# Patient Record
Sex: MALE | Race: OTHER | HISPANIC OR LATINO | URBAN - METROPOLITAN AREA
[De-identification: names, ages, dates, MRNs, and addresses within clinical notes are randomized per-mention and may not be internally consistent; named-entity substitution may affect disease eponyms.]

---

## 2023-07-11 ENCOUNTER — EMERGENCY (EMERGENCY)
Facility: HOSPITAL | Age: 23
LOS: 1 days | Discharge: HOME | End: 2023-07-11
Attending: EMERGENCY MEDICINE | Admitting: EMERGENCY MEDICINE
Payer: SELF-PAY

## 2023-07-11 VITALS
DIASTOLIC BLOOD PRESSURE: 63 MMHG | HEART RATE: 87 BPM | RESPIRATION RATE: 96 BRPM | SYSTOLIC BLOOD PRESSURE: 100 MMHG | OXYGEN SATURATION: 96 % | HEIGHT: 60 IN | TEMPERATURE: 98 F

## 2023-07-11 DIAGNOSIS — R00.1 BRADYCARDIA, UNSPECIFIED: ICD-10-CM

## 2023-07-11 DIAGNOSIS — Z20.822 CONTACT WITH AND (SUSPECTED) EXPOSURE TO COVID-19: ICD-10-CM

## 2023-07-11 DIAGNOSIS — R07.89 OTHER CHEST PAIN: ICD-10-CM

## 2023-07-11 DIAGNOSIS — R61 GENERALIZED HYPERHIDROSIS: ICD-10-CM

## 2023-07-11 DIAGNOSIS — R55 SYNCOPE AND COLLAPSE: ICD-10-CM

## 2023-07-11 DIAGNOSIS — R42 DIZZINESS AND GIDDINESS: ICD-10-CM

## 2023-07-11 LAB
ALBUMIN SERPL ELPH-MCNC: 4.8 G/DL — SIGNIFICANT CHANGE UP (ref 3.3–5)
ALP SERPL-CCNC: 131 U/L — HIGH (ref 40–120)
ALT FLD-CCNC: 20 U/L — SIGNIFICANT CHANGE UP (ref 10–45)
ANION GAP SERPL CALC-SCNC: 9 MMOL/L — SIGNIFICANT CHANGE UP (ref 5–17)
AST SERPL-CCNC: 24 U/L — SIGNIFICANT CHANGE UP (ref 10–40)
BASOPHILS # BLD AUTO: 0.04 K/UL — SIGNIFICANT CHANGE UP (ref 0–0.2)
BASOPHILS NFR BLD AUTO: 0.3 % — SIGNIFICANT CHANGE UP (ref 0–2)
BILIRUB SERPL-MCNC: 0.6 MG/DL — SIGNIFICANT CHANGE UP (ref 0.2–1.2)
BUN SERPL-MCNC: 10 MG/DL — SIGNIFICANT CHANGE UP (ref 7–23)
CALCIUM SERPL-MCNC: 9.4 MG/DL — SIGNIFICANT CHANGE UP (ref 8.4–10.5)
CHLORIDE SERPL-SCNC: 107 MMOL/L — SIGNIFICANT CHANGE UP (ref 96–108)
CO2 SERPL-SCNC: 29 MMOL/L — SIGNIFICANT CHANGE UP (ref 22–31)
CREAT SERPL-MCNC: 0.7 MG/DL — SIGNIFICANT CHANGE UP (ref 0.5–1.3)
EGFR: 134 ML/MIN/1.73M2 — SIGNIFICANT CHANGE UP
EOSINOPHIL # BLD AUTO: 0.09 K/UL — SIGNIFICANT CHANGE UP (ref 0–0.5)
EOSINOPHIL NFR BLD AUTO: 0.6 % — SIGNIFICANT CHANGE UP (ref 0–6)
FLUAV AG NPH QL: SIGNIFICANT CHANGE UP
FLUBV AG NPH QL: SIGNIFICANT CHANGE UP
GLUCOSE SERPL-MCNC: 91 MG/DL — SIGNIFICANT CHANGE UP (ref 70–99)
HCT VFR BLD CALC: 46 % — SIGNIFICANT CHANGE UP (ref 39–50)
HGB BLD-MCNC: 16.4 G/DL — SIGNIFICANT CHANGE UP (ref 13–17)
IMM GRANULOCYTES NFR BLD AUTO: 0.4 % — SIGNIFICANT CHANGE UP (ref 0–0.9)
LYMPHOCYTES # BLD AUTO: 1.38 K/UL — SIGNIFICANT CHANGE UP (ref 1–3.3)
LYMPHOCYTES # BLD AUTO: 9.9 % — LOW (ref 13–44)
MCHC RBC-ENTMCNC: 31.7 PG — SIGNIFICANT CHANGE UP (ref 27–34)
MCHC RBC-ENTMCNC: 35.7 GM/DL — SIGNIFICANT CHANGE UP (ref 32–36)
MCV RBC AUTO: 89 FL — SIGNIFICANT CHANGE UP (ref 80–100)
MONOCYTES # BLD AUTO: 0.44 K/UL — SIGNIFICANT CHANGE UP (ref 0–0.9)
MONOCYTES NFR BLD AUTO: 3.2 % — SIGNIFICANT CHANGE UP (ref 2–14)
NEUTROPHILS # BLD AUTO: 11.87 K/UL — HIGH (ref 1.8–7.4)
NEUTROPHILS NFR BLD AUTO: 85.6 % — HIGH (ref 43–77)
NRBC # BLD: 0 /100 WBCS — SIGNIFICANT CHANGE UP (ref 0–0)
PLATELET # BLD AUTO: 296 K/UL — SIGNIFICANT CHANGE UP (ref 150–400)
POTASSIUM SERPL-MCNC: 4.2 MMOL/L — SIGNIFICANT CHANGE UP (ref 3.5–5.3)
POTASSIUM SERPL-SCNC: 4.2 MMOL/L — SIGNIFICANT CHANGE UP (ref 3.5–5.3)
PROT SERPL-MCNC: 7.9 G/DL — SIGNIFICANT CHANGE UP (ref 6–8.3)
RBC # BLD: 5.17 M/UL — SIGNIFICANT CHANGE UP (ref 4.2–5.8)
RBC # FLD: 12.8 % — SIGNIFICANT CHANGE UP (ref 10.3–14.5)
RSV RNA NPH QL NAA+NON-PROBE: SIGNIFICANT CHANGE UP
SARS-COV-2 RNA SPEC QL NAA+PROBE: SIGNIFICANT CHANGE UP
SODIUM SERPL-SCNC: 145 MMOL/L — SIGNIFICANT CHANGE UP (ref 135–145)
TROPONIN T, HIGH SENSITIVITY RESULT: 12 NG/L — SIGNIFICANT CHANGE UP (ref 0–51)
TROPONIN T, HIGH SENSITIVITY RESULT: 9 NG/L — SIGNIFICANT CHANGE UP (ref 0–51)
WBC # BLD: 13.87 K/UL — HIGH (ref 3.8–10.5)
WBC # FLD AUTO: 13.87 K/UL — HIGH (ref 3.8–10.5)

## 2023-07-11 PROCEDURE — 71046 X-RAY EXAM CHEST 2 VIEWS: CPT | Mod: 26

## 2023-07-11 PROCEDURE — 99285 EMERGENCY DEPT VISIT HI MDM: CPT

## 2023-07-11 RX ORDER — SODIUM CHLORIDE 9 MG/ML
1000 INJECTION INTRAMUSCULAR; INTRAVENOUS; SUBCUTANEOUS ONCE
Refills: 0 | Status: COMPLETED | OUTPATIENT
Start: 2023-07-11 | End: 2023-07-11

## 2023-07-11 RX ADMIN — SODIUM CHLORIDE 1000 MILLILITER(S): 9 INJECTION INTRAMUSCULAR; INTRAVENOUS; SUBCUTANEOUS at 21:03

## 2023-07-11 NOTE — ED ADULT NURSE NOTE - NSFALLUNIVINTERV_ED_ALL_ED
Bed/Stretcher in lowest position, wheels locked, appropriate side rails in place/Call bell, personal items and telephone in reach/Instruct patient to call for assistance before getting out of bed/chair/stretcher/Non-slip footwear applied when patient is off stretcher/[place] to call system/Physically safe environment - no spills, clutter or unnecessary equipment/Purposeful proactive rounding/Room/bathroom lighting operational, light cord in reach

## 2023-07-11 NOTE — ED PROVIDER NOTE - NSFOLLOWUPINSTRUCTIONS_ED_ALL_ED_FT
Presíncope  Near-Syncope  Outline of the head showing blood vessels that supply the brain.  El presíncope ocurre cuando, súbitamente, usted se siente nelsy si se fuera a desmayar, kathy en realidad no pierde la conciencia. A mala también se lo puede llamar amenaza de síncope. [place] un episodio de presíncope, usted puede tener los siguientes síntomas:  Sentirse mareado, débil, aturdido o nelsy si la habitación estuviera girando.  Sentir náuseas.  Aissatou manchas o aissatou todo murphy o todo fortunato en el [place] de visión.  Tener la piel fría y húmeda o sentir calor y sudor.  Oír zumbidos en los oídos (tinnitus).  La causa de esta afección es janeen disminución súbita del flujo de rozina al cerebro. Esta disminución puede ocurrir por varios motivos, kathy la mayoría de las veces no es peligroso. Sin embargo, el presíncope puede ser janeen señal de un problema médico grave, por eso es importante buscar atención médica.    Siga estas indicaciones en mata casa:  Medicamentos    Use los medicamentos de venta berlin y los recetados solamente nelsy se lo haya indicado el médico.  Si está tomando medicamentos para la presión arterial o para el corazón, póngase de pie lentamente, tómese algunos minutos para permanecer sentado y luego párese. [place] puede reducir los mareos y el riesgo de presíncope.  Estilo de viridiana    No conduzca vehículos, no use maquinarias ni practique deportes hasta que el médico lo autorice.  No zaida alcohol.  No consuma ningún producto que contenga nicotina o tabaco. Estos productos incluyen cigarrillos, tabaco para mascar y aparatos de vapeo, nelsy los cigarrillos electrónicos. Si necesita ayuda para dejar de consumir estos productos, consulte al médico.  Evite saunas y bañeras de hidromasajes.  Indicaciones generales    Esté atento a cualquier cambio en los síntomas.  [place] con el médico acerca de marian síntomas. Es posible que deba realizarse estudios para entender la causa del presíncope.  Si comienza a sentir que va a desmayarse, siéntese o recuéstese inmediatamente. Si se sienta, coloque la estrellita entre las piernas. Si se recuesta, levante (eleve) los pies por encima del nivel del corazón.  Respire profundamente y de manera continua. Espere hasta que los síntomas hayan desaparecido.  Pídale a alguien que se quede con usted hasta que se sienta estable.  Zaida suficiente líquido nelsy para mantener la orina de color amarillo pálido.  Evite permanecer de pie mucho tiempo. Si debe permanecer de pie gunjan mucho tiempo, realice movimientos nelsy los siguientes:   las piernas.  Cruzar las piernas.  Flexionar y estirar los músculos de las piernas.  Ponerse en cuclillas.  Concurra a todas las visitas de seguimiento. [place] es importante.  Comuníquese con un médico si:  Sigue teniendo episodios nelsy si fuera a desmayarse.  Solicite ayuda de inmediato si:  Se desmaya.  Tiene cualquiera de estos síntomas que pueden indicar problemas en el corazón:  Latidos cardíacos acelerados o irregulares (palpitaciones).  Dolor fuera de lo normal en el pecho, el abdomen o la espalda.  Falta de aire.  Tiene janeen convulsión.  Siente un dolor de estrellita intenso.  Se siente confundido.  Tiene problemas de visión.  Tiene debilidad intensa o dificultad para caminar.  [place] por la boca o el recto, o marian heces son de color fortunato o de aspecto alquitranado.  Estos síntomas pueden representar un problema grave que constituye janeen emergencia. No espere hasta que los síntomas desaparezcan. Solicite atención médica de inmediato. Comuníquese con el servicio de emergencias de mata localidad (911 en los [place]). No conduzca por marian propios medios hasta el hospital.    Resumen  El presíncope ocurre cuando, súbitamente, usted se siente nelsy si se fuera a desmayar, kathy en realidad no pierde la conciencia.  La causa de esta afección es janeen disminución súbita del flujo de rozina al cerebro. Esta disminución puede ocurrir por varios motivos, kathy la mayoría de las veces no es peligroso.  El presíncope puede ser janeen señal de un problema médico grave, por eso es importante buscar atención médica.  Si comienza a sentir que va a desmayarse, siéntese o recuéstese inmediatamente. Si se sienta, coloque la estrellita entre las piernas. Si se recuesta, levante (eleve) los pies por encima del nivel del corazón.  [place] con el médico acerca de marian síntomas. Es posible que deba realizarse estudios para entender la causa del presíncope.  Esta información no tiene nelsy fin reemplazar el consejo del médico. Asegúrese de hacerle al médico cualquier pregunta que tenga.    Dolor de pecho no específico en los adultos  Nonspecific Chest Pain, Adult  El dolor de pecho es janeen sensación molesta, opresiva o dolorosa en el pecho. El dolor se siente nelsy janeen aplastamiento, torsión u opresión en el pecho. Janeen persona puede sentir janeen sensación de ardor u hormigueo. El dolor de pecho también se puede sentir en la espalda, el chapo, la mandíbula, el hombro o el brazo. Mala dolor puede empeorar al moverse, estornudar o respirar profundamente.    El dolor de pecho puede deberse a janeen afección potencialmente mortal. Se debe tratar de inmediato. También puede ser provocado por algo que no es potencialmente mortal. Si tiene dolor de pecho, puede ser difícil saber la diferencia, por lo tanto es importante que obtenga ayuda de inmediato para asegurarse de que no tiene janeen afección grave.    Algunas causas potencialmente mortales del dolor de pecho incluyen:  Infarto de miocardio.  Un desgarro en el vaso sanguíneo principal del cuerpo (disección aórtica).  Inflamación alrededor del corazón (pericarditis).  Un problema en los pulmones, nelsy un coágulo de rozina (embolia pulmonar) o un pulmón colapsado (neumotórax).  Algunas causas de dolor de pecho que no son potencialmente mortales incluyen:  Acidez estomacal.  Ansiedad o estrés.  Daño de los huesos, los músculos y los cartílagos que conforman la pared torácica.  Neumonía o bronquitis.  Culebrilla (virus de la varicela zóster).  El dolor de pecho puede aparecer y desaparecer. También puede ser keyon. Mata médico le hará pruebas clínicas y otros estudios para tratar de determinar la causa del dolor. El tratamiento dependerá de la causa del dolor de pecho.    Siga estas instrucciones en mata casa:  Medicamentos    Use los medicamentos de venta berlin y los recetados solamente nelsy se lo haya indicado el médico.  Si le recetaron un antibiótico, tómelo nelsy se lo haya indicado el médico. No deje de binh el antibiótico aunque comience a sentirse mejor.  Actividad    Evite las actividades que le causen dolor de pecho.  No levante ningún objeto que pese más de 10 libras (4,5 kg) o que supere el límite de peso que le hayan indicado, hasta que el médico le diga que puede hacerlo.  Abdias reposo nelsy se lo haya indicado el médico.  Retome marian actividades normales solo nelsy se lo haya indicado el médico. Pregúntele al médico qué actividades son seguras para usted.  Estilo de viridiana    A plate along with examples of foods in a healthy diet.  Silhouette of a person sitting on the floor doing yoga.  No consuma ningún producto que contenga nicotina o tabaco, nelsy cigarrillos, cigarrillos electrónicos y tabaco de mascar. Si necesita ayuda para dejar de fumar, consulte al médico.  No zaida alcohol.  Opte por un estilo de viridiana saludable nelsy se lo hayan recomendado. Puede incluir:  Practicar actividad física con regularidad. Pídale al médico que le sugiera ejercicios que malik seguros para usted.  Seguir janeen dieta cardiosaludable. Esta debe incluir muchas frutas y verduras frescas, cereales integrales, proteínas (magras) con bajo contenido de grasa y productos lácteos bajos en grasa. Un nutricionista podrá ayudarlo a hacer elecciones de alimentación saludables.  Mantener un peso saludable.  Controlar cualquier otra afección que tenga, nelsy presión arterial colette (hipertensión) o diabetes.  Disminuir el nivel de estrés; por ejemplo, con yoga o técnicas de relajación.  Instrucciones generales    Esté atento a cualquier cambio en los síntomas.  Es mata responsabilidad obtener los resultados de cualquier prueba que se haya realizado. Consulte al médico o pregunte en el departamento donde se realizan las pruebas cuándo estarán listos los resultados.  Concurra a todas las visitas de seguimiento nelsy se lo haya indicado el médico. [place] es importante.  Es posible que le pidan que se someta a más pruebas si el dolor de pecho no desaparece.  Comuníquese con un médico si:  El dolor de pecho no desaparece.  Se siente deprimido.  Tiene fiebre.  Nota cambios en los síntomas o desarrolla síntomas nuevos.  Solicite ayuda de inmediato si:  El dolor en el pecho empeora.  Tiene tos que empeora o tose con rozina.  Siente un dolor intenso en el abdomen.  Se desmaya.  Tiene janeen molestia repentina e inexplicable en el pecho.  Tiene molestias repentinas e inexplicables en los brazos, la espalda, el chapo o la mandíbula.  Le falta el aire en cualquier momento.  Comienza a sudar de manera repentina o la piel se le humedece.  Siente náuseas o vomita.  Se siente repentinamente mareado o se desmaya.  Tiene debilidad intensa, o debilidad o fatiga sin explicación.  Siente que el corazón comienza a latir rápidamente o que se saltea latidos.  Estos síntomas pueden representar un problema grave que constituye janeen emergencia. No espere a aissatou si los síntomas desaparecen. Solicite atención médica de inmediato. Comuníquese con el servicio de emergencias de mata localidad (911 en [place]). No conduzca por marian propios medios hasta el hospital.    Resumen  El dolor de pecho puede ser provocado por janeen afección que es grave y requiere tratamiento urgente. También puede ser provocado por algo que no es potencialmente mortal.  El médico puede hacerle pruebas clínicas y otros estudios para tratar de determinar la causa del dolor.  Siga las instrucciones de mata médico sobre binh medicamentos, hacer cambios en mata estilo de viridiana y recibir tratamiento de urgencia si los síntomas empeoran.  Concurra a todas las visitas de seguimiento nelsy se lo haya indicado el médico. [place] incluye las visitas para realizarle otras pruebas si el dolor de pecho no desaparece.  Esta información no tiene nelsy fin reemplazar el consejo del médico. Asegúrese de hacerle al médico cualquier pregunta que tenga.

## 2023-07-11 NOTE — ED PROVIDER NOTE - CLINICAL SUMMARY MEDICAL DECISION MAKING FREE TEXT BOX
22-year-old male with no past medical history complaining of episode of chest tightness, lightheadedness and feeling sweaty around 5 PM while [place] train.  Symptom lasted for about 5 minutes and then improved.  Denies fever, chills, shortness of breath, palpitation, nausea, vomiting, room spinning.  Denies history of syncope.  Denies family history of early CAD or sudden death.  Denies drug or alcohol use.  Patient now asymptomatic. VSS. Well appearing. PE unremarkable. EKG sinus nat. CXR clear 22-year-old male with no past medical history complaining of episode of chest tightness, lightheadedness and feeling sweaty around 5 PM while he was [place] the train.  Symptom lasted for about 5 minutes and then improved.  Denies fever, chills, shortness of breath, palpitation, nausea, vomiting, room spinning.  Denies history of syncope.  Denies family history of early CAD or sudden death.  Denies drug or alcohol use.  Patient now asymptomatic. VSS. Well appearing. PE unremarkable. EKG sinus nat. CXR clear. likely vasaovagal episode/ orthostasis given pt has not eaten all day, low suspicion for acute cardiac event. will check labs, cxr

## 2023-07-11 NOTE — ED PROVIDER NOTE - NS ED ATTENDING STATEMENT MOD
This was a shared visit with the [place]. I reviewed and verified the documentation and independently performed the documented:

## 2023-07-11 NOTE — ED PROVIDER NOTE - PATIENT PORTAL LINK FT
You can access the FollowMyHealth Patient Portal offered by [facility] by registering at the following website: [email or web address]. By joining [employer]’s FollowMyHealth portal, you will also be able to view your health information using other applications (apps) compatible with our system.

## 2023-07-11 NOTE — ED PROVIDER NOTE - OBJECTIVE STATEMENT
22-year-old male with no past medical history complaining of episode of chest tightness, lightheadedness and feeling sweaty around 5 PM while he [place] the train.  Symptom lasted for about 5 minutes and then improved.  Denies fever, chills, shortness of breath, palpitation, nausea, vomiting, room spinning.  Denies history of syncope.  Denies family history of early CAD or sudden death.  Denies drug or alcohol use.  Patient now asymptomatic.  States he has not eaten all day, last meal was dinner last night. 22-year-old male with no past medical history complaining of episode of chest tightness, lightheadedness and feeling sweaty around 5 PM while he [place] the train. Felt like he was going to pass out. Symptom lasted for about 5 minutes and then improved.  Denies fever, chills, shortness of breath, palpitation, nausea, vomiting, room spinning.  Denies history of syncope.  Denies family history of early CAD or sudden death.  Denies drug or alcohol use.  Patient now asymptomatic.  States he has not eaten all day, last meal was dinner last night.

## 2023-07-11 NOTE — ED PROVIDER NOTE - ATTENDING APP SHARED VISIT CONTRIBUTION OF CARE
23 yo male w no PMH , at 5 pm on train, tightness in chest , lightheaded / sweaty , lasted 5 min then resolved , no n/v , no palpitations. ekg sinus nat at 57, trop low but increased by 3 , will repeat trop third time due to increase although suspect more likely w vasovagal presyncope '  dispo pending 3rd trop

## 2023-07-11 NOTE — ED PROVIDER NOTE - PROGRESS NOTE DETAILS
intial trop 9, 1hr rpt 12, will get 3 hour rpt. low suspicion for ACS, more likely vasovagal near syncope [peters] s/o rcvd from dr bateman. dispo pending rpt trop+ekg. rpt trop 6. ekg unchanged/nonischemic. perc neg. sx resolved. steady gait. will dc w/ outpatient pcp fu. [peters] s/o rcvd from dr bateman. dispo pending rpt trop+ekg. rpt trop 6. ekg unchanged/nonischemic. perc neg. sx resolved w/o recurrent. upon reevaluation, pt describes presyncopal episode while feeling overheated on train improved once off train and w/ fresh air. no ivdu/recreational drugs/etoh. no phx cardiac disease. no fhx sudden cardiac death. requesting to go home. steady gait. will dc w/ outpatient cards fu per request.

## 2023-07-11 NOTE — ED ADULT NURSE NOTE - OBJECTIVE STATEMENT
Presents for c/o episode of palpitations with dizziness and diaphoresis x 5 mins around 5pm today on train. Denies LOC or fall. Denies present symptoms. Denies CP/SOB/weakness/dizziness/tingling/cough/fevers/known sick contacts.      On assessment- AOx4, breathing even and unlabored on RA, no apparent distress, VSS in triage, able to speak in clear coherent sentences, steady gait unassisted, neuro intact with no apparent facial asymmetry, PERRLA. EKG completed in triage and presented to MD.

## 2023-07-11 NOTE — ED ADULT TRIAGE NOTE - CHIEF COMPLAINT QUOTE
Pt states "I started having midsternal chest pressure and Smartness of breathing starting 1/2 hour ago with lightheadedness." Pt denies PMH.

## 2023-07-12 VITALS
OXYGEN SATURATION: 99 % | DIASTOLIC BLOOD PRESSURE: 55 MMHG | HEART RATE: 60 BPM | RESPIRATION RATE: 16 BRPM | SYSTOLIC BLOOD PRESSURE: 99 MMHG | TEMPERATURE: 98 F

## 2023-07-12 LAB
APPEARANCE UR: CLEAR — SIGNIFICANT CHANGE UP
BACTERIA # UR AUTO: ABNORMAL /HPF
BILIRUB UR-MCNC: NEGATIVE — SIGNIFICANT CHANGE UP
COLOR SPEC: YELLOW — SIGNIFICANT CHANGE UP
COMMENT - URINE: SIGNIFICANT CHANGE UP
DIFF PNL FLD: ABNORMAL
EPI CELLS # UR: SIGNIFICANT CHANGE UP /HPF (ref 0–5)
GLUCOSE UR QL: NEGATIVE — SIGNIFICANT CHANGE UP
KETONES UR-MCNC: NEGATIVE — SIGNIFICANT CHANGE UP
LEUKOCYTE ESTERASE UR-ACNC: NEGATIVE — SIGNIFICANT CHANGE UP
NITRITE UR-MCNC: NEGATIVE — SIGNIFICANT CHANGE UP
PH UR: 6 — SIGNIFICANT CHANGE UP (ref 5–8)
PROT UR-MCNC: NEGATIVE MG/DL — SIGNIFICANT CHANGE UP
RBC CASTS # UR COMP ASSIST: < 5 /HPF — SIGNIFICANT CHANGE UP
SP GR SPEC: >=1.03 — SIGNIFICANT CHANGE UP (ref 1–1.03)
TROPONIN T, HIGH SENSITIVITY RESULT: 6 NG/L — SIGNIFICANT CHANGE UP (ref 0–51)
UROBILINOGEN FLD QL: 1 E.U./DL — SIGNIFICANT CHANGE UP
WBC UR QL: < 5 /HPF — SIGNIFICANT CHANGE UP

## 2023-07-12 PROCEDURE — 81001 URINALYSIS AUTO W/SCOPE: CPT

## 2023-07-12 PROCEDURE — 36415 COLL VENOUS BLD VENIPUNCTURE: CPT

## 2023-07-12 PROCEDURE — 84484 ASSAY OF TROPONIN QUANT: CPT

## 2023-07-12 PROCEDURE — 85025 COMPLETE CBC W/AUTO DIFF WBC: CPT

## 2023-07-12 PROCEDURE — 71046 X-RAY EXAM CHEST 2 VIEWS: CPT

## 2023-07-12 PROCEDURE — 87637 SARSCOV2&INF A&B&RSV AMP PRB: CPT

## 2023-07-12 PROCEDURE — 99285 EMERGENCY DEPT VISIT HI MDM: CPT | Mod: 25

## 2023-07-12 PROCEDURE — 80053 COMPREHEN METABOLIC PANEL: CPT

## 2023-07-12 PROCEDURE — 82962 GLUCOSE BLOOD TEST: CPT

## 2023-07-12 PROCEDURE — 93005 ELECTROCARDIOGRAM TRACING: CPT
